# Patient Record
Sex: FEMALE | Race: WHITE
[De-identification: names, ages, dates, MRNs, and addresses within clinical notes are randomized per-mention and may not be internally consistent; named-entity substitution may affect disease eponyms.]

---

## 2017-04-27 NOTE — RADRPT
PROCEDURE:   Right Upper Quadrant Ultrasound. 

 

CLINICAL INDICATION:   Abdominal Pain

 

TECHNIQUE:   Multiple real-time images were acquired of the patient's right upper quadrant abdomen a
nd retroperitoneum utilizing a high resolution transducer. 

 

COMPARISON:   None 

 

FINDINGS:

 

The liver measures 14.1 cm, and demonstrates moderately increased echogenicity. The main portal vein
 is patent with proper directional flow. There is no intrahepatic biliary ductal dilatation. The ext
rahepatic common bile duct measures 3 mm.

 

There is cholelithiasis.  There is mild nonspecific gallbladder wall thickening to 4 mm which may at
 least in part be due to underdistension.  There is no pericholecystic fluid.

 

The visualized pancreas is unremarkable.

 

The right kidney measures 9.7 x 4.1 x 5.4 cm and demonstrates normal echotexture. There is no right 
renal calculus or hydronephrosis.

 

The visualized abdominal aorta and IVC are grossly unremarkable.

 

 

IMPRESSION:

Moderate fatty infiltration of the liver.

 

There is cholelithiasis.  There is mild nonspecific gallbladder wall thickening which may at least i
n part be due to underdistension without pericholecystic fluid.  Acute cholecystitis is considered u
nlikely although clinical correlation is recommended.  Normal CBD.

 

 

RPTAT: EE

_____________________________________________ 

Physician Sisi           Date    Time 

Electronically viewed and signed by Physician Sisi on 04/27/2017 13:41 

 

D:  04/27/2017 13:41  T:  04/27/2017 13:41

ROSEANNE/

## 2017-04-27 NOTE — ERD
ER Documentation


Chief Complaint


Date/Time


DATE: 4/27/17


Chief Complaint


Right upper quadrant abdominal pain





HPI


The patient is a 21-year-old female who presents to the Emergency Department 

with complaint of intermittent right upper quadrant abdominal pain for one 

week. The patient reports that she has been experiencing her "gallstone pain" 

with increasing frequency over the past week.  The pain is localized to the 

right upper quadrant of her abdomen and radiates to the back.  It is aching in 

nature, and intermittent, often provoked by eating large, heavy meals. In the 

past, she would take the previously prescribed medications (Tramadol, Ibuprofen 

and Pepcid) with complete relief of pain. However, she notes that she ran out 

of her medications approximately 10 days ago, and therefore has not had any 

medication for relief of her pain.  She notes 0/10 pain at this time, with 10/

10 pain at maximum intensity. Her pain last occurred last night, and therefore 

she tried to make an appointment with her primary medical provider today, 

though no appointments were available. Therefore, she presents today. She 

denies any nausea, vomiting, diarrhea, dysuria, hematuria, flank pain, urinary 

frequency, urinary urgency, vaginal bleeding or new vaginal discharge. Last 

menstrual period was 3/17/2017.





ROS


All systems reviewed and are negative except as per history of present illness.





Medications


Home Meds


Active Scripts


Docusate Sodium* (Colace*) 100 Mg Capsule, 100 MG PO TID, #30 CAP


   Prov:CYNTHIA CHIN PA-C         4/27/17


Famotidine* (Pepcid*) 20 Mg Tablet, 20 MG PO BID, #20 TAB


   Prov:CYNTHIA CHIN PA-C         4/27/17


Ibuprofen* (Motrin*) 600 Mg Tab, 600 MG PO Q6, #30 TAB


   Prov:CYNTHIA CHIN PA-C         4/27/17


Tramadol HCl (Tramadol HCl) 50 Mg Tablet, 50 MG PO Q6 Y for PAIN, #15 TAB


   Prov:CYNTHIA CHIN PA-C         4/27/17





Allergies


Allergies:  


Coded Allergies:  


     No Known Allergy (Unverified , 4/27/17)





PMhx/Soc


Medical and Surgical Hx:  pt denies Medical Hx, pt denies Surgical Hx


Hx Alcohol Use:  No


Hx Substance Use:  No


Hx Tobacco Use:  No





Physical Exam


Vitals





Vital Signs








  Date Time  Temp Pulse Resp B/P Pulse Ox O2 Delivery O2 Flow Rate FiO2


 


4/27/17 11:11 98.0 60 18 124/60 97   








Physical Exam


GENERAL: Well-developed, well-nourished, female, in no acute distress


HEENT: Head is normocephalic, atraumatic. No scleral pallor or icterus. Pupils 

equal, round and reactive to light. Extraocular movements intact. Conjunctiva 

pink. Moist mucous membranes. 


NECK: Supple. No masses, no tenderness, no lymphadenopathy. Trachea midline. 


RESPIRATORY: Lungs are clear to auscultation bilaterally. Equal breath sounds.  

Normal expiratory effort. 


CARDIOVASCULAR:  Regular rate and rhythm. S1 and S2 normal.   No murmurs, rubs, 

or gallops.  Distal pulses are palpable, 2+ bilaterally. Capillary refill is 

less than 2 seconds. 


GASTROINTESTINAL: Abdomen is soft, nontender and nondistended. Negative Gonzalez'

s sign. No tenderness at McBurney's point. No guarding, no rebound tenderness. 

Normal bowel sounds. No gross peritonitis. 


FLANK: No CVA tenderness.


EXTREMITIES: No clubbing, cyanosis, or edema. Normal skin perfusion. Moving all 

extremities. Muscle tone is normal.  No focal swelling or erythema. 


NEUROLOGIC: The patient is alert, awake, and oriented x 3. No focal neurologic 

deficits.  


INTEGUMENT:  Skin is intact. Warm and dry. 


PSYCHIATRIC: Cooperative; appropriate.


Result Diagram:  


4/27/17 1250                                                                   

             4/27/17 1250





Results 24 hrs





 Laboratory Tests








Test


  4/27/17


12:40 4/27/17


12:50


 


Urine Color LT. YELLOW  


 


Urine Clarity CLEAR  


 


Urine pH 6.0  


 


Urine Specific Gravity 1.025  


 


Urine Ketones NEGATIVE  


 


Urine Nitrite NEGATIVE  


 


Urine Bilirubin NEGATIVE  


 


Urine Urobilinogen 0.2  E.U./dL  


 


Urine Leukocyte Esterase NEGATIVE  


 


Urine Hemoglobin NEGATIVE  


 


Urine Glucose NEGATIVE%  


 


Urine Total Protein NEGATIVE  


 


White Blood Count  7.110^3/ul 


 


Red Blood Count  4.4010^6/ul 


 


Hemoglobin  11.4g/dl 


 


Hematocrit  37.0% 


 


Mean Corpuscular Volume  84.1fl 


 


Mean Corpuscular Hemoglobin  25.9pg 


 


Mean Corpuscular Hemoglobin


Concent 


  30.8g/dl 


 


 


Red Cell Distribution Width  13.8% 


 


Platelet Count  72050^3/UL 


 


Mean Platelet Volume  10.3fl 


 


Neutrophils %  58.2% 


 


Lymphocytes %  32.4% 


 


Monocytes %  5.6% 


 


Eosinophils %  3.4% 


 


Basophils %  0.3% 


 


Nucleated Red Blood Cells %  0.0/100WBC 


 


Neutrophils #  4.210^3/ul 


 


Lymphocytes #  2.310^3/ul 


 


Monocytes #  0.410^3/ul 


 


Eosinophils #  0.210^3/ul 


 


Basophils #  0.010^3/ul 


 


Nucleated Red Blood Cells #  0.010^3/ul 


 


Sodium Level  143mmol/L 


 


Potassium Level  3.7mmol/L 


 


Chloride Level  105mmol/L 


 


Carbon Dioxide Level  26mmol/L 


 


Anion Gap  16 


 


Blood Urea Nitrogen  11mg/dl 


 


Creatinine  0.68mg/dl 


 


Glucose Level  113mg/dl 


 


Calcium Level  9.3mg/dl 


 


Total Bilirubin  0.2mg/dl 


 


Direct Bilirubin  0.00mg/dl 


 


Indirect Bilirubin  0.2mg/dl 


 


Aspartate Amino Transf


(AST/SGOT) 


  28IU/L 


 


 


Alanine Aminotransferase


(ALT/SGPT) 


  34IU/L 


 


 


Alkaline Phosphatase  87IU/L 


 


Total Protein  8.3g/dl 


 


Albumin  4.5g/dl 


 


Globulin  3.80g/dl 


 


Albumin/Globulin Ratio  1.18 


 


Lipase  137U/L 











Procedures/MDM


Diagnostic Tests and Interpretation:


PROCEDURE:   Right Upper Quadrant Ultrasound.  


CLINICAL INDICATION:   Abdominal Pain 


TECHNIQUE:   Multiple real-time images were acquired of the patient's right 

upper quadrant abdomen and retroperitoneum utilizing a high resolution 

transducer.  


COMPARISON:   None  


FINDINGS: 


The liver measures 14.1 cm, and demonstrates moderately increased echogenicity. 

The main portal vein is patent with proper directional flow. There is no 

intrahepatic biliary ductal dilatation. The extrahepatic common bile duct 

measures 3 mm. 


There is cholelithiasis.  There is mild nonspecific gallbladder wall thickening 

to 4 mm which may at least in part be due to underdistension.  There is no 

pericholecystic fluid. 


The visualized pancreas is unremarkable. 


The right kidney measures 9.7 x 4.1 x 5.4 cm and demonstrates normal 

echotexture. There is no right renal calculus or hydronephrosis. 


The visualized abdominal aorta and IVC are grossly unremarkable. 


IMPRESSION: Moderate fatty infiltration of the liver. There is cholelithiasis.  

There is mild nonspecific gallbladder wall thickening which may at least in 

part be due to underdistension without pericholecystic fluid.  Acute 

cholecystitis is considered unlikely although clinical correlation is 

recommended.  Normal CBD.


_____________________________________________ 


Physician Sisi           Date    Time 


Electronically viewed and signed by Gennaro Trivedi Physician on 04/27/2017 13:41 





Imaging findings discussed with Dr. Bethea, who recommends that the patient be 

discharged home, as she is experiencing no current pain at this time, negative 

Gonzalez's sign, no leukocytosis, normal bilirubin, and no transaminitis. 





Medical Decision Making: This is a 21-year-old female presenting to the 

Emergency Department with complaint of right upper quadrant abdominal pain. On 

physical examination, the patient's abdomen was soft throughout, with no 

tenderness to palpation, no distention, no guarding, no gross peritonitis. 

Differential diagnosis includes, but is not limited to, gastroenteritis, 

gastritis, cholecystitis, cholangitis, choledocholithiasis, pancreatitis, 

perforated viscus, mesenteric ischemia, GERD, PUD, urinary tract infection, 

acute coronary syndrome, peptic ulcer disease, pyelonephritis, pneumonia, 

hepatitis, infectious diarrhea, IBD, aortic dissection, torsion, bowel 

obstruction, appendicitis, diverticulitis. Laboratory analysis with no 

leukocytosis. No elevated bilirubin or transaminitis. Lipase is normal. 

Urinalysis with no nitrites, no urine leukocyte esterace, no evidence for 

urinary tract infection. Ultrasound of the right upper quadrant revealed 

cholelithiasis with nonspecific gallbladder wall thickening. However, no 

pericholecystic fluid noted, no additional findings to suggest acute 

cholecystitis. Otherwise, no evidence of acute cholangitis, choledocholithiasis 

or gallstone pancreatitis. After rest, the patient reports no new complaints 

and no pain.





Upon review and interpretation of the patient's presentation and overall ER 

course, I believe the patient's symptoms are most consistent with right upper 

quadrant abdominal pain, likely secondary to biliary colic/cholelithiasis 

without cholecystitis. Doubt acute coronary syndrome - symptoms and examination 

inconsistent. Doubt pancreatitis - clinical presentation inconsistent, lipase 

normal. Doubt perforated ulcer, patient has a non-surgical abdomen. Doubt small 

bowel obstruction, patient is passing flatus, abdomen is non-distended. Doubt 

appendicitis, patient has no McBurney's point tenderness, no guarding, non-

surgical abdomen, no tenderness over the RLQ. Doubt diverticulitis, exam 

inconsistent. Doubt ischemic bowel, no pain out of proportion to examination. 

Doubt torsion, symptoms and examination inconsistent.





At this time, the patient is in stable condition and therefore can be 

discharged home with prescriptions for Ibuprofen, Pepcid (per patient's request)

, Tramadol and Colace, and strict return precautions for signs of deteriorating 

or worsening condition. She is advised to follow up with her primary care 

provider in 2-3 days for reevaluation and further management, or return to the 

ER sooner if symptoms worsen. I shared my medical decision making, plan, as 

well as the results with the patient at length and in great detail, and she 

verbally understands and agrees with the plan for further observation and care 

as an outpatient. At the time of discharge, all questions were answered.





Departure


Diagnosis:  


 Primary Impression:  


 Biliary colic


 Additional Impressions:  


 Cholelithiasis without cholecystitis


 Right upper quadrant abdominal pain


Condition:  Stable


Patient Instructions:  Biliary Colic With Gallstone (Confirmed), Coping with 

Colic, Gallstones





Additional Instructions:  


Call your primary care doctor TOMORROW for an appointment during the next 2-3 

days.See the doctor sooner or return here if your condition worsens before your 

appointment time.











CYNTHIA CHIN PA-C Apr 27, 2017 13:20

## 2017-11-26 ENCOUNTER — HOSPITAL ENCOUNTER (EMERGENCY)
Dept: HOSPITAL 10 - FTE | Age: 22
Discharge: HOME | End: 2017-11-26
Payer: COMMERCIAL

## 2017-11-26 VITALS
WEIGHT: 166.67 LBS | BODY MASS INDEX: 29.53 KG/M2 | WEIGHT: 166.67 LBS | HEIGHT: 63 IN | BODY MASS INDEX: 29.53 KG/M2 | HEIGHT: 63 IN

## 2017-11-26 DIAGNOSIS — K80.20: Primary | ICD-10-CM

## 2017-11-26 DIAGNOSIS — N39.0: ICD-10-CM

## 2017-11-26 LAB
ADD UMIC: YES
ALBUMIN SERPL-MCNC: 4.3 G/DL (ref 3.3–4.9)
ALBUMIN/GLOB SERPL: 1.19 {RATIO}
ALP SERPL-CCNC: 109 IU/L (ref 42–121)
ALT SERPL-CCNC: 34 IU/L (ref 13–69)
ANION GAP SERPL CALC-SCNC: 16 MMOL/L (ref 8–16)
AST SERPL-CCNC: 20 IU/L (ref 15–46)
BACTERIA #/AREA URNS HPF: (no result) /HPF
BASOPHILS # BLD AUTO: 0 10^3/UL (ref 0–0.1)
BASOPHILS NFR BLD: 0.2 % (ref 0–2)
BILIRUB DIRECT SERPL-MCNC: 0 MG/DL (ref 0–0.2)
BILIRUB SERPL-MCNC: 0.1 MG/DL (ref 0.2–1.3)
BUN SERPL-MCNC: 11 MG/DL (ref 7–20)
CALCIUM SERPL-MCNC: 9.1 MG/DL (ref 8.4–10.2)
CHLORIDE SERPL-SCNC: 104 MMOL/L (ref 97–110)
CO2 SERPL-SCNC: 27 MMOL/L (ref 21–31)
COLOR UR: YELLOW
CREAT SERPL-MCNC: 0.78 MG/DL (ref 0.44–1)
EOSINOPHIL # BLD: 0.1 10^3/UL (ref 0–0.5)
EOSINOPHIL NFR BLD: 0.5 % (ref 0–7)
ERYTHROCYTE [DISTWIDTH] IN BLOOD BY AUTOMATED COUNT: 13.5 % (ref 11.5–14.5)
GLOBULIN SER-MCNC: 3.6 G/DL (ref 1.3–3.2)
GLUCOSE SERPL-MCNC: 139 MG/DL (ref 70–220)
GLUCOSE UR STRIP-MCNC: NEGATIVE MG/DL
HCT VFR BLD CALC: 37.1 % (ref 37–47)
HGB BLD-MCNC: 11.6 G/DL (ref 12–16)
KETONES UR STRIP.AUTO-MCNC: NEGATIVE MG/DL
LYMPHOCYTES # BLD AUTO: 1.2 10^3/UL (ref 0.8–2.9)
LYMPHOCYTES NFR BLD AUTO: 13.3 % (ref 15–51)
MCH RBC QN AUTO: 25.9 PG (ref 29–33)
MCHC RBC AUTO-ENTMCNC: 31.3 G/DL (ref 32–37)
MCV RBC AUTO: 82.8 FL (ref 82–101)
MONOCYTES # BLD: 0.4 10^3/UL (ref 0.3–0.9)
MONOCYTES NFR BLD: 3.8 % (ref 0–11)
MUCOUS THREADS #/AREA URNS HPF: (no result) /HPF
NEUTROPHILS # BLD: 7.6 10^3/UL (ref 1.6–7.5)
NEUTROPHILS NFR BLD AUTO: 82 % (ref 39–77)
NITRITE UR QL STRIP.AUTO: NEGATIVE MG/DL
NRBC # BLD MANUAL: 0 10^3/UL (ref 0–0)
NRBC BLD AUTO-RTO: 0 /100WBC (ref 0–0)
PLATELET # BLD: 288 10^3/UL (ref 140–415)
PMV BLD AUTO: 10.5 FL (ref 7.4–10.4)
POTASSIUM SERPL-SCNC: 3.9 MMOL/L (ref 3.5–5.1)
PROT SERPL-MCNC: 7.9 G/DL (ref 6.1–8.1)
RBC # BLD AUTO: 4.48 10^6/UL (ref 4.2–5.4)
RBC # UR AUTO: (no result) MG/DL
SODIUM SERPL-SCNC: 143 MMOL/L (ref 135–144)
SQUAMOUS #/AREA URNS HPF: (no result) /HPF
UR ASCORBIC ACID: NEGATIVE MG/DL
UR BILIRUBIN (DIP): NEGATIVE MG/DL
UR CLARITY: (no result)
UR PH (DIP): 7 (ref 5–9)
UR RBC: 35 /HPF (ref 0–5)
UR SPECIFIC GRAVITY (DIP): 1.02 (ref 1–1.03)
UR TOTAL PROTEIN (DIP): (no result) MG/DL
UROBILINOGEN UR STRIP-ACNC: NEGATIVE MG/DL
WBC # BLD AUTO: 9.3 10^3/UL (ref 4.8–10.8)
WBC # UR STRIP: (no result) LEU/UL

## 2017-11-26 PROCEDURE — 80053 COMPREHEN METABOLIC PANEL: CPT

## 2017-11-26 PROCEDURE — 74176 CT ABD & PELVIS W/O CONTRAST: CPT

## 2017-11-26 PROCEDURE — 81001 URINALYSIS AUTO W/SCOPE: CPT

## 2017-11-26 PROCEDURE — 36415 COLL VENOUS BLD VENIPUNCTURE: CPT

## 2017-11-26 PROCEDURE — 96374 THER/PROPH/DIAG INJ IV PUSH: CPT

## 2017-11-26 PROCEDURE — 96375 TX/PRO/DX INJ NEW DRUG ADDON: CPT

## 2017-11-26 PROCEDURE — 85025 COMPLETE CBC W/AUTO DIFF WBC: CPT

## 2017-11-26 PROCEDURE — 83690 ASSAY OF LIPASE: CPT

## 2017-11-26 PROCEDURE — 76705 ECHO EXAM OF ABDOMEN: CPT

## 2017-11-26 NOTE — ERD
ER Documentation


Chief Complaint


Chief Complaint


RUQ w/suprapubic pressure just now





HPI


Patient is a 22-year-old female with a past medical history of cholelithiasis 

who presents to the ED for concerns of right-sided flank pain radiating up into 

her right upper quadrant as well as suprapubic tenderness and dysuria.  Patient 

states her symptoms started 2 hours prior to her arrival.  She states she has 

"gallbladder pain".  Patient states that the pain is constant, throbbing in 

nature.  Patient denies any fevers or chills.  Patient minutes to nausea and 

vomiting 3, nonbloody nonbilious.  Also admits to dysuria.  She denies any 

hematuria.  Patien denies any diarrhea, chest pain, shortness of breath, or 

extremity pain or loss consciousness.  Patient denies any previous abdominal 

surgeries.





ROS


All systems reviewed and are negative except as per history of present illness.





Medications


Home Meds


Active Scripts


Ondansetron (Ondansetron Odt) 4 Mg Tab.rapdis, 4 MG PO Q6H Y for NAUSEA AND/OR 

VOMITING, #10 TAB


   Prov:ANGELICA PELLETIER PA-C         17


Hydrocodone/Acetaminophen (Norco 5-325 Tablet) 1 Each Tablet, 1 TAB PO Q6H Y 

for PAIN, #7 TAB


   Prov:ANGELICA PELLETIER PA-C         17


Nitrofurantoin Monohyd Macrocr* (Macrobid*) 100 Mg Capsr, 100 MG PO BID for 5 

Days, CAP


   Prov:ANGELICA PELLETIER PA-C         17


Ibuprofen* (Motrin*) 600 Mg Tab, 600 MG PO Q6, #30 TAB


   Prov:ANGELICA PELLETIER PA-C         17


Docusate Sodium* (Colace*) 100 Mg Capsule, 100 MG PO TID, #30 CAP


   Prov:CYNTHIA CHIN PA-C         17


Famotidine* (Pepcid*) 20 Mg Tablet, 20 MG PO BID, #20 TAB


   Prov:CYNTHIA CHIN PA-C         17


Ibuprofen* (Motrin*) 600 Mg Tab, 600 MG PO Q6, #30 TAB


   Prov:CYNTHIA CHIN PA-C         17


Tramadol HCl (Tramadol HCl) 50 Mg Tablet, 50 MG PO Q6 Y for PAIN, #15 TAB


   Prov:CYNTHIA CHIN PA-C         17





Allergies


Allergies:  


Coded Allergies:  


     No Known Allergy (Unverified , 17)





PMhx/Soc


Medical and Surgical Hx:  pt denies Surgical Hx


Hx Miscellaneous Medical Probl:  Yes (UTI)


Hx Alcohol Use:  No


Hx Substance Use:  No


Hx Tobacco Use:  No


Smoking Status:  Never smoker





Physical Exam


Vitals





Vital Signs








  Date Time  Temp Pulse Resp B/P Pulse Ox O2 Delivery O2 Flow Rate FiO2


 


17 04:04 98.6 60 18 130/64 98   








Physical Exam


GENERAL: Well-developed, well-nourished female. Appears in no acute distress. 


HEAD: Normocephalic, atraumatic. 


EYES: Pupils are equally reactive bilaterally. EOMs grossly intact. No 

conjunctival erythema. 


ENT: Moist mucous membranes. No uvula deviation. No kissing tonsils. 


NECK: Supple. No meningismus. Normal range of motion of the neck.


LUNG: Clear to auscultation bilaterally. No rhonchi, wheezing, rales or coarse 

breath sounds. 


HEART: Regular rate and rhythm. No murmurs, rubs or gallops.


ABDOMEN:  Soft, and nondistended.  Tender to palpation in the right upper 

quadrant.  Positive Gonzalez sign.  Positive bowel sounds in all four quadrants. 

No rebound tenderness, no guarding. (-) McBurney's point tenderness. No CVA 

tenderness.


EXTREMITIES: Equal pulses bilaterally. No peripheral clubbing, cyanosis or 

edema. No unilateral leg swelling.


NEUROLOGIC: Alert and oriented. Moving all four extremities without any 

difficulty. Normal speech. Steady gait. 


SKIN: Normal color. Warm and dry. No rashes or lesions.


Result Diagram:  


170                                                                  

              17 0450





Results 24 hrs





 Laboratory Tests








Test


  17


04:50


 


White Blood Count 9.310^3/ul 


 


Red Blood Count 4.4810^6/ul 


 


Hemoglobin 11.6g/dl 


 


Hematocrit 37.1% 


 


Mean Corpuscular Volume 82.8fl 


 


Mean Corpuscular Hemoglobin 25.9pg 


 


Mean Corpuscular Hemoglobin


Concent 31.3g/dl 


 


 


Red Cell Distribution Width 13.5% 


 


Platelet Count 94797^3/UL 


 


Mean Platelet Volume 10.5fl 


 


Neutrophils % 82.0% 


 


Lymphocytes % 13.3% 


 


Monocytes % 3.8% 


 


Eosinophils % 0.5% 


 


Basophils % 0.2% 


 


Nucleated Red Blood Cells % 0.0/100WBC 


 


Neutrophils # 7.610^3/ul 


 


Lymphocytes # 1.210^3/ul 


 


Monocytes # 0.410^3/ul 


 


Eosinophils # 0.110^3/ul 


 


Basophils # 0.010^3/ul 


 


Nucleated Red Blood Cells # 0.010^3/ul 


 


Urine Color YELLOW 


 


Urine Clarity CLOUDY 


 


Urine pH 7.0 


 


Urine Specific Gravity 1.019 


 


Urine Ketones NEGATIVEmg/dL 


 


Urine Nitrite NEGATIVEmg/dL 


 


Urine Bilirubin NEGATIVEmg/dL 


 


Urine Urobilinogen NEGATIVEmg/dL 


 


Urine Leukocyte Esterase 1+Heidi/ul 


 


Urine Microscopic RBC 35/HPF 


 


Urine Microscopic WBC 97/HPF 


 


Urine Squamous Epithelial


Cells FEW/HPF 


 


 


Urine Bacteria MODERATE/HPF 


 


Urine Mucus MODERATE/HPF 


 


Urine Hemoglobin 1+mg/dL 


 


Urine Glucose NEGATIVEmg/dL 


 


Urine Total Protein 1+mg/dl 


 


Sodium Level 143mmol/L 


 


Potassium Level 3.9mmol/L 


 


Chloride Level 104mmol/L 


 


Carbon Dioxide Level 27mmol/L 


 


Anion Gap 16 


 


Blood Urea Nitrogen 11mg/dl 


 


Creatinine 0.78mg/dl 


 


Glucose Level 139mg/dl 


 


Calcium Level 9.1mg/dl 


 


Total Bilirubin 0.1mg/dl 


 


Direct Bilirubin 0.00mg/dl 


 


Indirect Bilirubin 0.1mg/dl 


 


Aspartate Amino Transf


(AST/SGOT) 20IU/L 


 


 


Alanine Aminotransferase


(ALT/SGPT) 34IU/L 


 


 


Alkaline Phosphatase 109IU/L 


 


Total Protein 7.9g/dl 


 


Albumin 4.3g/dl 


 


Globulin 3.60g/dl 


 


Albumin/Globulin Ratio 1.19 


 


Lipase 151U/L 








 Current Medications








 Medications


  (Trade)  Dose


 Ordered  Sig/Sowmya


 Route


 PRN Reason  Start Time


 Stop Time Status Last Admin


Dose Admin


 


 Ondansetron HCl


  (Zofran Inj)  4 mg  ONCE  STAT


 IV


   17 04:27


 17 04:28 DC 17 04:56


 


 


 Ketorolac


 Tromethamine


  (Toradol)  30 mg  ONCE  STAT


 IV


   17 04:27


 17 04:28 DC 17 04:56


 











Procedures/MDM


ED COURSE:


The patient was stable throughout ED course. I kept the patient and/or family 

informed of laboratory and diagnostic imaging results throughout the ED course.

  





 


DIAGNOSTIC IMAGING:


Read by radiologist.





 Patient: ZOEY BACA   : 1995   Age: 22  Sex: F                  

      


 MR #:    [id number]   Acct #:   [id number]    DOS: 17


 Ordering MD: ANGELICA PELLETIER PA-C   Location:  [facility]   Room/Bed:                 

                           


 








PROCEDURE:    Abdominal ultrasound, limited. 


 


CLINICAL INDICATION:    Abdominal pain. 


 


TECHNIQUE:    Multiple real-time images were acquired of the patient's right 

upper abdomen utilizing a high resolution transducer. 


 


COMPARISON:   2017. 


 


FINDINGS:


The liver demonstrates increased echogenicity and size measuring 16.9 cm.  

There is no focal mass or intrahepatic biliary ductal dilatation.  The portal 

vein is patent.  The gallbladder is not distended.  Multiple echogenic 

gallstones are identified. There is focal tenderness over the gallbladder. 

There is no pericholecystic fluid. There is mild gallbladder wall thickening 

measuring 3.5 mm.  The common bile duct measures 2.5 mm in maximal dimension.  

The pancreas is obscured by overlying bowel gas.  No free fluid is identified.


 


The right kidney is normal size and echogenicity measuring 10.1 cm.  There is 

no focal renal mass or echogenic calculus identified.  There is no obstructive 

uropathy. 


 


IMPRESSION:


Cholelithiasis with mild gallbladder wall thickening and positive sonographic 

Gonzalez's sign.


Fatty infiltration of the liver.


Pancreas obscured by overlying bowel gas.


_____________________________________________ 


.Edenilson Miranda MD, MD           Date    Time 


Electronically viewed and signed by .Edenilson Miranda MD, MD on 2017 05:51 


 


D:  2017 05:51  T:  2017 05:51


.T/





CC: ANGELICA PELLETIER PA-C











 Patient: ZOEY BACA   : 1995   Age: 22  Sex: F                  

      


 MR #:    [id number]   Acct #:   [id number]    DOS: 17


 Ordering MD: ANGELICA PELLETIER PA-C   Location:  [facility]   Room/Bed:                 

                           


 








PROCEDURE:   CT Abdomen and pelvis without contrast. 


 


CLINICAL INDICATION:   Abdominal pain. 


 


TECHNIQUE:    CT scan of the abdomen and pelvis was performed on a multi-

detector high-resolution CT scanner.  Contiguous axial images were obtained 

from the lung bases to the ischial tuberosities without intravenous contrast.  

Coronal and sagittal reformatted images were also obtained.  Images were 

reviewed on the PACS workstation. DICOM images are available.


 


One or more of the following dose reduction techniques were used:


- Automated exposure control.


- Adjustment of the mA and/or kV according to patient size.


- Use of iterative reconstruction technique.


 


Exam CTD/vol = 12.35 mGy.


Total exam DLP = 701.75 mGy-cm.   


 


COMPARISON:   None. 


 


FINDINGS:


Evaluation of the lung bases demonstrates no pleural or parenchymal disease.


 


Abdomen:  The liver is normal in size.  There is no focal mass or dilatation of 

the biliary tree.  The gallbladder is not distended. Multiple radiolucent 

gallstones are present. There is mild gallbladder wall thickening. The spleen, 

pancreas and bilateral adrenal glands are within normal limits.  Bilateral 

kidneys are normal in size with no contour deforming mass identified. There is 

mild right renal cortical scarring.  There is no radiopaque renal or ureteral 

calculus identified.  There is no hydronephrosis or hydroureter.  There is no 

retroperitoneal adenopathy.  The abdominal aorta is of normal caliber.


 


There is no abnormal bowel wall thickening or distension.  There is no bowel 

obstruction or free air.  A normal appendix is identified.  There is no 

diverticulosis or diverticulitis.  There is no ascites.


 


Pelvis:  The bladder demonstrates mild wall thickening.  The uterus and adnexa 

are within normal limits.  There is mild pelvic free fluid.  There is no 

significant pelvic adenopathy.


 


Evaluation of the osseous structures demonstrates no suspicious lytic or 

blastic lesion. 


 


IMPRESSION:


Cholelithiasis with mild gallbladder wall thickening.


Mild bladder wall thickening could suggest cystitis. Clinical correlation is 

needed.


Mild pelvic free fluid.


Mild right renal cortical scarring.


Normal appendix.


_____________________________________________ 


.Edenilson Miranda MD, MD           Date    Time 


Electronically viewed and signed by .Edenilson Miranda MD, MD on 2017 05:50 


 


D:  2017 05:50  T:  2017 05:50


.T/





CC: ANGELICA PELLETIER PA-C








PROCEDURES:


None.





MEDICATIONS GIVEN: 


Toradol, Zofran


Patient tolerated medication well with no adverse reactions. 








MEDICAL DECISION MAKING: 


This is a 22-year-old female who presents to the ED for concerns of right upper 

quadrant pain 2 hours.  Patient denies any fevers or chills.  Patient did 

admit to nausea and vomiting.  Vital signs were reviewed. Patient is afebrile. 


CBC showed no evidence of systemic infection or severe anemia.  CMP showed no 

evidence of electrolyte abnormalities, severe acidosis, alkalosis, renal failure

, or liver disease. Lipase showed no evidence of acute pancreatitis.  UA did 

show 1+ leukocyte esterase, positive WBCs and positive RBCs.  Urine pregnancy 

test was negative. CT abdomen and pelvis showed Cholelithiasis with mild 

gallbladder wall thickening. Mild bladder wall thickening could suggest 

cystitis. Clinical correlation is needed. Mild pelvic free fluid. Mild right 

renal cortical scarring. Normal appendix.  Gallbladder ultrasound showed 

Cholelithiasis with mild gallbladder wall thickening and positive sonographic 

Gonzalez's sign. Fatty infiltration of the liver. Pancreas obscured by overlying 

bowel gas.  I discussed this case with my supervising physician Dr. Sher, 

who also examined the patient reviewed all her laboratory studies and imaging 

studies.  At this time, he does not feel that patient requires admission for 

emergent cholecystectomy or antibiotics.  Patient was advised that she will 

need to have her gallbladder taken out on an outpatient basis.  Referral 

information was provided.  At this time, patient's presentation is most 

consistent with cholelithiasis with gallbladder wall thickening and UTI vs 

early pyelonephritis.  Low suspicion for appendicitis, bowel obstruction, bowel 

perforation, DKA, bowel perforation, acute cholecystitis, choledocholithiasis, 

pancreatitis, diverticulitis, pyelonephritis, nephrolithiasis. 





PRESCRIPTIONS: 


Ibuprofen, Norco, Macrobid, Zofran





DISCHARGE:


At this time, patient is stable for discharge and outpatient management.  

Patient was given a copy of all imaging studies and blood work obtained today.  

Patient advised to follow-up with the surgeon on an outpatient basis.  Referral 

information provided.  I have instructed the patient to follow-up with his/her 

primary care physician in 1-2 days. I have instructed the patient to promptly 

return to the ER at any time for any new or worsening symptoms including 

increased pain, nausea, vomiting, diarrhea, fever, weakness or LOC. The patient 

and/or family expressed understanding of and agreement with this plan. All 

questions were answered. Home care instructions were provided. 








Disclaimer: Inadvertent spelling and grammatical errors are likely due to EHR/

dictation software use and do not reflect on the overall quality of patient 

care. Also, please note that the electronic time recorded on this note does not 

necessarily reflect the actual time of the patient encounter.





Departure


Diagnosis:  


 Primary Impression:  


 Thickening of wall of gallbladder


 Additional Impressions:  


 Cholelithiasis


 Cholelithiasis location:  gallbladder  Cholecystitis presence:  without 

cholecystitis  Biliary obstruction:  without biliary obstruction  Qualified Code

:  K80.20 - Calculus of gallbladder without cholecystitis without obstruction


 UTI (urinary tract infection)


 Urinary tract infection type:  site unspecified  Hematuria presence:  with 

hematuria  Qualified Code:  N39.0 - Urinary tract infection with hematuria, 

site unspecified


Condition:  Stable


Patient Instructions:  Understanding Urinary Tract Infections (UTIs), Gallstones


Referrals:  


JAIME VIERA KEITH MD DEL JUNCO, TIRSO MD FARID,WALTER MIR MD, M.D., SAID MD KOSARI, KAMBIZ M.D.








[facility]


YOU HAVE RECEIVED A MEDICAL SCREENING EXAM AND THE RESULTS INDICATE THAT YOU DO 

NOT HAVE A CONDITION THAT REQUIRES URGENT TREATMENT IN THE EMERGENCY DEPARTMENT.





FURTHER EVALUATION AND TREATMENT OF YOUR CONDITION CAN WAIT UNTIL YOU ARE SEEN 

IN YOUR DOCTORS OFFICE WITHIN THE NEXT 1-2 DAYS. IT IS YOUR RESPONSIBILITY TO 

MAKE AN APPOINTMENT FOR FOLOW-UP CARE.





IF YOU HAVE A PRIMARY DOCTOR


--you should call your primary doctor and schedule an appointment





IF YOU DO NOT HAVE A PRIMARY DOCTOR YOU CAN CALL OUR PHYSICIAN REFERRAL HOTLINE 

AT


 [phone] 





IF YOU CAN NOT AFFORD TO SEE A PHYSICIAN YOU CAN CHOSE FROM THE FOLLOWING 

[facility] CLINICS





[facility] [phone] [address] [facility] [phone] [address] [facility] [phone] [address] [facility] [phone] [address] [facility] [phone] [address] [facility]. [facility]. [phone] [address] [facility]. [facility]


YOU HAVE RECEIVED A MEDICAL SCREENING EXAM AND THE RESULTS INDICATE THAT YOU DO 

NOT HAVE A CONDITION THAT REQUIRES URGENT TREATMENT IN THE EMERGENCY DEPARTMENT.





FURTHER EVALUATION AND TREATMENT OF YOUR CONDITION CAN WAIT UNTIL YOU ARE SEEN 

IN YOUR DOCTORS OFFICE WITHIN THE NEXT 1-2 DAYS. IT IS YOUR RESPONSIBILITY TO 

MAKE AN APPOINTMENT FOR FOLOW-UP CARE.





IF YOU HAVE A PRIMARY DOCTOR


--you should call your primary doctor and schedule and appointment





IF YOU DO NOT HAVE A PRIMARY DOCTOR YOU CAN CALL OUR PHYSICIAN REFERRAL HOTLINE 

AT [phone].





IF YOU CAN NOT AFFORD TO SEE A PHYSICIAN YOU CAN CHOSE FROM THE FOLLOWING 

[facility] INSTITUTIONS:





[facility]


[address] [place] [address]





[facility]


[address] [place] [address]





[facility]


[address] [place] [address]





Additional Instructions:  


Call your primary care doctor TOMORROW for an appointment during the next 1-2 

days.See the doctor sooner or return here if your condition worsens before your 

appointment time.





Follow-up with the general surgeon for further management of your 

cholelithiasis.  You may need to have your gallbladder taken out on an 

outpatient basis.











ANGELICA PELLETIER PA-C 2017 06:27

## 2017-11-26 NOTE — RADRPT
PROCEDURE:   CT Abdomen and pelvis without contrast. 

 

CLINICAL INDICATION:   Abdominal pain. 

 

TECHNIQUE:    CT scan of the abdomen and pelvis was performed on a multi-detector high-resolution CT
 scanner.  Contiguous axial images were obtained from the lung bases to the ischial tuberosities wit
hout intravenous contrast.  Coronal and sagittal reformatted images were also obtained.  Images were
 reviewed on the PACS workstation. DICOM images are available.

 

One or more of the following dose reduction techniques were used:

- Automated exposure control.

- Adjustment of the mA and/or kV according to patient size.

- Use of iterative reconstruction technique.

 

Exam CTD/vol = 12.35 mGy.

Total exam DLP = 701.75 mGy-cm.   

 

COMPARISON:   None. 

 

FINDINGS:

Evaluation of the lung bases demonstrates no pleural or parenchymal disease.

 

Abdomen:  The liver is normal in size.  There is no focal mass or dilatation of the biliary tree.  T
he gallbladder is not distended. Multiple radiolucent gallstones are present. There is mild gallblad
kareem wall thickening. The spleen, pancreas and bilateral adrenal glands are within normal limits.  Bi
lateral kidneys are normal in size with no contour deforming mass identified. There is mild right re
nal cortical scarring.  There is no radiopaque renal or ureteral calculus identified.  There is no h
ydronephrosis or hydroureter.  There is no retroperitoneal adenopathy.  The abdominal aorta is of no
rmal caliber.

 

There is no abnormal bowel wall thickening or distension.  There is no bowel obstruction or free air
.  A normal appendix is identified.  There is no diverticulosis or diverticulitis.  There is no asci
samir.

 

Pelvis:  The bladder demonstrates mild wall thickening.  The uterus and adnexa are within normal bethea
its.  There is mild pelvic free fluid.  There is no significant pelvic adenopathy.

 

Evaluation of the osseous structures demonstrates no suspicious lytic or blastic lesion. 

 

IMPRESSION:

Cholelithiasis with mild gallbladder wall thickening.

Mild bladder wall thickening could suggest cystitis. Clinical correlation is needed.

Mild pelvic free fluid.

Mild right renal cortical scarring.

Normal appendix.

_____________________________________________ 

.Edenilson Miranda MD, MD           Date    Time 

Electronically viewed and signed by .Edenilson Miranda MD, MD on 11/26/2017 05:50 

 

D:  11/26/2017 05:50  T:  11/26/2017 05:50

.T/

## 2017-11-26 NOTE — RADRPT
PROCEDURE:    Abdominal ultrasound, limited. 

 

CLINICAL INDICATION:    Abdominal pain. 

 

TECHNIQUE:    Multiple real-time images were acquired of the patient's right upper abdomen utilizing
 a high resolution transducer. 

 

COMPARISON:   04/27/2017. 

 

FINDINGS:

The liver demonstrates increased echogenicity and size measuring 16.9 cm.  There is no focal mass or
 intrahepatic biliary ductal dilatation.  The portal vein is patent.  The gallbladder is not distend
ed.  Multiple echogenic gallstones are identified. There is focal tenderness over the gallbladder. T
here is no pericholecystic fluid. There is mild gallbladder wall thickening measuring 3.5 mm.  The c
ommon bile duct measures 2.5 mm in maximal dimension.  The pancreas is obscured by overlying bowel g
as.  No free fluid is identified.

 

The right kidney is normal size and echogenicity measuring 10.1 cm.  There is no focal renal mass or
 echogenic calculus identified.  There is no obstructive uropathy. 

 

IMPRESSION:

Cholelithiasis with mild gallbladder wall thickening and positive sonographic Gonzalez's sign.

Fatty infiltration of the liver.

Pancreas obscured by overlying bowel gas.

_____________________________________________ 

.Edenilson Miranda MD, MD           Date    Time 

Electronically viewed and signed by .Edenilson Miranda MD, MD on 11/26/2017 05:51 

 

D:  11/26/2017 05:51  T:  11/26/2017 05:51

.T/

## 2018-03-03 ENCOUNTER — HOSPITAL ENCOUNTER (EMERGENCY)
Age: 23
Discharge: HOME | End: 2018-03-03

## 2018-03-03 ENCOUNTER — HOSPITAL ENCOUNTER (EMERGENCY)
Dept: HOSPITAL 91 - E/R | Age: 23
Discharge: HOME | End: 2018-03-03
Payer: COMMERCIAL

## 2018-03-03 DIAGNOSIS — K82.8: ICD-10-CM

## 2018-03-03 DIAGNOSIS — R10.11: ICD-10-CM

## 2018-03-03 DIAGNOSIS — K80.50: ICD-10-CM

## 2018-03-03 DIAGNOSIS — R10.2: ICD-10-CM

## 2018-03-03 DIAGNOSIS — Z3A.08: ICD-10-CM

## 2018-03-03 DIAGNOSIS — O46.8X1: ICD-10-CM

## 2018-03-03 DIAGNOSIS — O99.611: Primary | ICD-10-CM

## 2018-03-03 LAB
ADD MAN DIFF?: NO
ADD UMIC: YES
ALANINE AMINOTRANSFERASE: 27 IU/L (ref 13–69)
ALBUMIN/GLOBULIN RATIO: 1.13
ALBUMIN: 4.3 G/DL (ref 3.3–4.9)
ALKALINE PHOSPHATASE: 101 IU/L (ref 42–121)
ANION GAP: 19 (ref 8–16)
ASPARTATE AMINO TRANSFERASE: 18 IU/L (ref 15–46)
BASOPHIL #: 0 10^3/UL (ref 0–0.1)
BASOPHILS %: 0.2 % (ref 0–2)
BILIRUBIN,DIRECT: 0 MG/DL (ref 0–0.2)
BILIRUBIN,TOTAL: 0.1 MG/DL (ref 0.2–1.3)
BLOOD UREA NITROGEN: 8 MG/DL (ref 7–20)
CALCIUM: 9.5 MG/DL (ref 8.4–10.2)
CARBON DIOXIDE: 25 MMOL/L (ref 21–31)
CHLORIDE: 103 MMOL/L (ref 97–110)
CREATININE: 0.63 MG/DL (ref 0.44–1)
EOSINOPHILS #: 0 10^3/UL (ref 0–0.5)
EOSINOPHILS %: 0.5 % (ref 0–7)
GLOBULIN: 3.8 G/DL (ref 1.3–3.2)
GLUCOSE: 134 MG/DL (ref 70–220)
HEMATOCRIT: 36.4 % (ref 37–47)
HEMOGLOBIN: 12 G/DL (ref 12–16)
LIPASE: 199 U/L (ref 23–300)
LYMPHOCYTES #: 1.1 10^3/UL (ref 0.8–2.9)
LYMPHOCYTES %: 13.2 % (ref 15–51)
MEAN CORPUSCULAR HEMOGLOBIN: 27.5 PG (ref 29–33)
MEAN CORPUSCULAR HGB CONC: 33 G/DL (ref 32–37)
MEAN CORPUSCULAR VOLUME: 83.3 FL (ref 82–101)
MEAN PLATELET VOLUME: 10.8 FL (ref 7.4–10.4)
MONOCYTE #: 0.3 10^3/UL (ref 0.3–0.9)
MONOCYTES %: 3.6 % (ref 0–11)
NEUTROPHIL #: 7 10^3/UL (ref 1.6–7.5)
NEUTROPHILS %: 82.1 % (ref 39–77)
NUCLEATED RED BLOOD CELLS #: 0 10^3/UL (ref 0–0)
NUCLEATED RED BLOOD CELLS%: 0 /100WBC (ref 0–0)
PLATELET COUNT: 292 10^3/UL (ref 140–415)
POTASSIUM: 3.8 MMOL/L (ref 3.5–5.1)
RED BLOOD COUNT: 4.37 10^6/UL (ref 4.2–5.4)
RED CELL DISTRIBUTION WIDTH: 13.6 % (ref 11.5–14.5)
SODIUM: 143 MMOL/L (ref 135–144)
TOTAL PROTEIN: 8.1 G/DL (ref 6.1–8.1)
UR AMORPHOUS CRYSTAL: (no result) /HPF
UR ASCORBIC ACID: NEGATIVE MG/DL
UR BILIRUBIN (DIP): NEGATIVE MG/DL
UR BLOOD (DIP): (no result) MG/DL
UR CLARITY: (no result)
UR COLOR: YELLOW
UR GLUCOSE (DIP): NEGATIVE MG/DL
UR KETONES (DIP): NEGATIVE MG/DL
UR LEUKOCYTE ESTERASE (DIP): (no result) LEU/UL
UR NITRITE (DIP): NEGATIVE MG/DL
UR PH (DIP): 7 (ref 5–9)
UR RBC: 3 /HPF (ref 0–5)
UR SPECIFIC GRAVITY (DIP): 1.02 (ref 1–1.03)
UR SQUAMOUS EPITHELIAL CELL: (no result) /HPF
UR TOTAL PROTEIN (DIP): NEGATIVE MG/DL
UR UROBILINOGEN (DIP): NEGATIVE MG/DL
UR WBC: 22 /HPF (ref 0–5)
URINE LEUKOCYTE EST (DIP) POC: (no result)
URINE PH (DIP) POC: 7 (ref 5–8.5)
URINE TOTAL PROTEIN POC: (no result)
WHITE BLOOD COUNT: 8.5 10^3/UL (ref 4.8–10.8)

## 2018-03-03 PROCEDURE — 96375 TX/PRO/DX INJ NEW DRUG ADDON: CPT

## 2018-03-03 PROCEDURE — 76705 ECHO EXAM OF ABDOMEN: CPT

## 2018-03-03 PROCEDURE — 81001 URINALYSIS AUTO W/SCOPE: CPT

## 2018-03-03 PROCEDURE — 96374 THER/PROPH/DIAG INJ IV PUSH: CPT

## 2018-03-03 PROCEDURE — 83690 ASSAY OF LIPASE: CPT

## 2018-03-03 PROCEDURE — 99285 EMERGENCY DEPT VISIT HI MDM: CPT

## 2018-03-03 PROCEDURE — 85025 COMPLETE CBC W/AUTO DIFF WBC: CPT

## 2018-03-03 PROCEDURE — 80053 COMPREHEN METABOLIC PANEL: CPT

## 2018-03-03 PROCEDURE — 81003 URINALYSIS AUTO W/O SCOPE: CPT

## 2018-03-03 PROCEDURE — 36415 COLL VENOUS BLD VENIPUNCTURE: CPT

## 2018-03-03 PROCEDURE — 76801 OB US < 14 WKS SINGLE FETUS: CPT

## 2018-03-03 RX ADMIN — ONDANSETRON HYDROCHLORIDE 1 MG: 2 INJECTION, SOLUTION INTRAMUSCULAR; INTRAVENOUS at 03:51

## 2018-03-03 RX ADMIN — MORPHINE SULFATE 1 MG: 2 INJECTION, SOLUTION INTRAMUSCULAR; INTRAVENOUS at 03:51

## 2018-03-03 RX ADMIN — THIAMINE HYDROCHLORIDE 1 MLS/HR: 100 INJECTION, SOLUTION INTRAMUSCULAR; INTRAVENOUS at 03:51

## 2018-03-03 RX ADMIN — ACETAMINOPHEN 1 MG: 325 TABLET, FILM COATED ORAL at 04:25

## 2018-08-12 ENCOUNTER — HOSPITAL ENCOUNTER (INPATIENT)
Dept: HOSPITAL 91 - OBT | Age: 23
LOS: 1 days | Discharge: HOME | DRG: 780 | End: 2018-08-13
Payer: COMMERCIAL

## 2018-08-12 ENCOUNTER — HOSPITAL ENCOUNTER (INPATIENT)
Age: 23
LOS: 1 days | Discharge: HOME | DRG: 780 | End: 2018-08-13

## 2018-08-12 DIAGNOSIS — Z3A.33: ICD-10-CM

## 2018-08-12 DIAGNOSIS — O47.03: Primary | ICD-10-CM

## 2018-08-12 LAB
ADD MAN DIFF?: NO
ADD UMIC: NO
BASOPHIL #: 0 10^3/UL (ref 0–0.1)
BASOPHILS %: 0.1 % (ref 0–2)
EOSINOPHILS #: 0 10^3/UL (ref 0–0.5)
EOSINOPHILS %: 0 % (ref 0–7)
HEMATOCRIT: 26.2 % (ref 37–47)
HEMOGLOBIN: 8.3 G/DL (ref 12–16)
IMMATURE GRANS #M: 0.03 10^3/UL
IMMATURE GRANS % (M): 0.3 %
LYMPHOCYTES #: 1.1 10^3/UL (ref 0.8–2.9)
LYMPHOCYTES %: 12.4 % (ref 15–51)
MEAN CORPUSCULAR HEMOGLOBIN: 27 PG (ref 29–33)
MEAN CORPUSCULAR HGB CONC: 31.7 G/DL (ref 32–37)
MEAN CORPUSCULAR VOLUME: 85.3 FL (ref 82–101)
MEAN PLATELET VOLUME: 11.3 FL (ref 7.4–10.4)
MONOCYTE #: 0.5 10^3/UL (ref 0.3–0.9)
MONOCYTES %: 5.3 % (ref 0–11)
NEUTROPHIL #: 7.1 10^3/UL (ref 1.6–7.5)
NEUTROPHILS %: 81.9 % (ref 39–77)
NUCLEATED RED BLOOD CELLS #: 0 10^3/UL (ref 0–0)
NUCLEATED RED BLOOD CELLS%: 0 /100WBC (ref 0–0)
PLATELET COUNT: 219 10^3/UL (ref 140–415)
RED BLOOD COUNT: 3.07 10^6/UL (ref 4.2–5.4)
RED CELL DISTRIBUTION WIDTH: 13.2 % (ref 11.5–14.5)
RUPTURE FETAL MEMBRANES: NEGATIVE
UR ASCORBIC ACID: NEGATIVE MG/DL
UR BILIRUBIN (DIP): NEGATIVE MG/DL
UR BLOOD (DIP): NEGATIVE MG/DL
UR CLARITY: CLEAR
UR COLOR: YELLOW
UR GLUCOSE (DIP): NEGATIVE MG/DL
UR KETONES (DIP): NEGATIVE MG/DL
UR LEUKOCYTE ESTERASE (DIP): NEGATIVE LEU/UL
UR NITRITE (DIP): NEGATIVE MG/DL
UR PH (DIP): 7 (ref 5–9)
UR SPECIFIC GRAVITY (DIP): 1.01 (ref 1–1.03)
UR TOTAL PROTEIN (DIP): NEGATIVE MG/DL
UR UROBILINOGEN (DIP): (no result) MG/DL
WHITE BLOOD COUNT: 8.7 10^3/UL (ref 4.8–10.8)

## 2018-08-12 PROCEDURE — 81003 URINALYSIS AUTO W/O SCOPE: CPT

## 2018-08-12 PROCEDURE — 86901 BLOOD TYPING SEROLOGIC RH(D): CPT

## 2018-08-12 PROCEDURE — 84112 EVAL AMNIOTIC FLUID PROTEIN: CPT

## 2018-08-12 PROCEDURE — 86850 RBC ANTIBODY SCREEN: CPT

## 2018-08-12 PROCEDURE — 76817 TRANSVAGINAL US OBSTETRIC: CPT

## 2018-08-12 PROCEDURE — 36415 COLL VENOUS BLD VENIPUNCTURE: CPT

## 2018-08-12 PROCEDURE — 86900 BLOOD TYPING SEROLOGIC ABO: CPT

## 2018-08-12 PROCEDURE — 96372 THER/PROPH/DIAG INJ SC/IM: CPT

## 2018-08-12 PROCEDURE — 85025 COMPLETE CBC W/AUTO DIFF WBC: CPT

## 2018-08-12 PROCEDURE — 96361 HYDRATE IV INFUSION ADD-ON: CPT

## 2018-08-12 PROCEDURE — 76818 FETAL BIOPHYS PROFILE W/NST: CPT

## 2018-08-12 PROCEDURE — 96360 HYDRATION IV INFUSION INIT: CPT

## 2018-08-12 RX ADMIN — PYRIDOXINE HYDROCHLORIDE 1 MLS/HR: 100 INJECTION, SOLUTION INTRAMUSCULAR; INTRAVENOUS at 01:09

## 2018-08-12 RX ADMIN — TERBUTALINE SULFATE 1 MG: 1 INJECTION SUBCUTANEOUS at 01:12

## 2018-08-12 RX ADMIN — Medication 1 TAB: at 08:17

## 2018-08-12 RX ADMIN — TERBUTALINE SULFATE 1 MG: 1 INJECTION SUBCUTANEOUS at 03:04

## 2018-08-12 RX ADMIN — PYRIDOXINE HYDROCHLORIDE 1 MLS/HR: 100 INJECTION, SOLUTION INTRAMUSCULAR; INTRAVENOUS at 02:39

## 2018-08-12 RX ADMIN — PYRIDOXINE HYDROCHLORIDE 1 MLS/HR: 100 INJECTION, SOLUTION INTRAMUSCULAR; INTRAVENOUS at 14:58

## 2018-08-12 RX ADMIN — ACETAMINOPHEN 1 MG: 500 TABLET, FILM COATED ORAL at 05:32

## 2018-08-12 RX ADMIN — DOCUSATE SODIUM 1 MG: 100 CAPSULE, LIQUID FILLED ORAL at 08:16

## 2018-08-12 RX ADMIN — DOCUSATE SODIUM 1 MG: 100 CAPSULE, LIQUID FILLED ORAL at 21:00

## 2018-08-12 RX ADMIN — PYRIDOXINE HYDROCHLORIDE 1 MLS/HR: 100 INJECTION, SOLUTION INTRAMUSCULAR; INTRAVENOUS at 06:08

## 2018-08-12 RX ADMIN — PYRIDOXINE HYDROCHLORIDE 1 MLS/HR: 100 INJECTION, SOLUTION INTRAMUSCULAR; INTRAVENOUS at 23:07

## 2018-08-12 RX ADMIN — FERROUS SULFATE TAB 325 MG (65 MG ELEMENTAL FE) 1 MG: 325 (65 FE) TAB at 08:16

## 2018-08-12 RX ADMIN — BETAMETHASONE SODIUM PHOSPHATE AND BETAMETHASONE ACETATE 1 MG: 3; 3 INJECTION, SUSPENSION INTRA-ARTICULAR; INTRALESIONAL; INTRAMUSCULAR at 06:53

## 2018-08-13 RX ADMIN — BETAMETHASONE SODIUM PHOSPHATE AND BETAMETHASONE ACETATE 1 MG: 3; 3 INJECTION, SUSPENSION INTRA-ARTICULAR; INTRALESIONAL; INTRAMUSCULAR at 06:37

## 2018-08-13 RX ADMIN — Medication 1 TAB: at 08:16

## 2018-08-13 RX ADMIN — DOCUSATE SODIUM 1 MG: 100 CAPSULE, LIQUID FILLED ORAL at 08:16

## 2018-08-13 RX ADMIN — PYRIDOXINE HYDROCHLORIDE 1 MLS/HR: 100 INJECTION, SOLUTION INTRAMUSCULAR; INTRAVENOUS at 06:46

## 2018-08-13 RX ADMIN — FERROUS SULFATE TAB 325 MG (65 MG ELEMENTAL FE) 1 MG: 325 (65 FE) TAB at 08:16

## 2018-09-26 ENCOUNTER — HOSPITAL ENCOUNTER (INPATIENT)
Age: 23
LOS: 4 days | Discharge: HOME | End: 2018-09-30

## 2018-09-26 ENCOUNTER — HOSPITAL ENCOUNTER (INPATIENT)
Dept: HOSPITAL 91 - OBT | Age: 23
LOS: 4 days | Discharge: HOME | End: 2018-09-30
Payer: COMMERCIAL

## 2018-09-26 DIAGNOSIS — Z3A.40: ICD-10-CM

## 2018-09-26 LAB
ADD MAN DIFF?: NO
BASOPHIL #: 0 10^3/UL (ref 0–0.1)
BASOPHILS %: 0.2 % (ref 0–2)
EOSINOPHILS #: 0 10^3/UL (ref 0–0.5)
EOSINOPHILS %: 0.2 % (ref 0–7)
HEMATOCRIT: 32.7 % (ref 37–47)
HEMOGLOBIN: 9.9 G/DL (ref 12–16)
IMMATURE GRANS #M: 0.03 10^3/UL (ref 0–0.03)
IMMATURE GRANS % (M): 0.3 % (ref 0–0.43)
INR: 0.86
LYMPHOCYTES #: 1.9 10^3/UL (ref 0.8–2.9)
LYMPHOCYTES %: 18.9 % (ref 15–51)
MEAN CORPUSCULAR HEMOGLOBIN: 25.3 PG (ref 29–33)
MEAN CORPUSCULAR HGB CONC: 30.3 G/DL (ref 32–37)
MEAN CORPUSCULAR VOLUME: 83.6 FL (ref 82–101)
MEAN PLATELET VOLUME: 12 FL (ref 7.4–10.4)
MONOCYTE #: 0.4 10^3/UL (ref 0.3–0.9)
MONOCYTES %: 4.4 % (ref 0–11)
NEUTROPHIL #: 7.5 10^3/UL (ref 1.6–7.5)
NEUTROPHILS %: 76 % (ref 39–77)
NUCLEATED RED BLOOD CELLS #: 0 10^3/UL (ref 0–0)
NUCLEATED RED BLOOD CELLS%: 0 /100WBC (ref 0–0)
PARTIAL THROMBOPLASTIN TIME: 26.1 SEC (ref 23–35)
PLATELET COUNT: 242 10^3/UL (ref 140–415)
PROTIME: 11.8 SEC (ref 11.9–14.9)
PT RATIO: 0.9
RED BLOOD COUNT: 3.91 10^6/UL (ref 4.2–5.4)
RED CELL DISTRIBUTION WIDTH: 14.3 % (ref 11.5–14.5)
WHITE BLOOD COUNT: 9.8 10^3/UL (ref 4.8–10.8)

## 2018-09-26 PROCEDURE — 76818 FETAL BIOPHYS PROFILE W/NST: CPT

## 2018-09-26 PROCEDURE — 86850 RBC ANTIBODY SCREEN: CPT

## 2018-09-26 PROCEDURE — 86901 BLOOD TYPING SEROLOGIC RH(D): CPT

## 2018-09-26 PROCEDURE — 85730 THROMBOPLASTIN TIME PARTIAL: CPT

## 2018-09-26 PROCEDURE — 85025 COMPLETE CBC W/AUTO DIFF WBC: CPT

## 2018-09-26 PROCEDURE — 87340 HEPATITIS B SURFACE AG IA: CPT

## 2018-09-26 PROCEDURE — 86900 BLOOD TYPING SEROLOGIC ABO: CPT

## 2018-09-26 PROCEDURE — 76815 OB US LIMITED FETUS(S): CPT

## 2018-09-26 PROCEDURE — 62319: CPT

## 2018-09-26 PROCEDURE — 85610 PROTHROMBIN TIME: CPT

## 2018-09-26 PROCEDURE — 86592 SYPHILIS TEST NON-TREP QUAL: CPT

## 2018-09-26 RX ADMIN — BUTORPHANOL TARTRATE 1 MG: 2 INJECTION, SOLUTION INTRAMUSCULAR; INTRAVENOUS at 21:33

## 2018-09-26 RX ADMIN — PYRIDOXINE HYDROCHLORIDE 1 MLS/HR: 100 INJECTION, SOLUTION INTRAMUSCULAR; INTRAVENOUS at 16:27

## 2018-09-27 LAB
HEPATITIS B SURFACE ANTIGEN: NEGATIVE
RAPID PLASMA REAGIN: NONREACTIVE

## 2018-09-27 RX ADMIN — PYRIDOXINE HYDROCHLORIDE 1 MLS/HR: 100 INJECTION, SOLUTION INTRAMUSCULAR; INTRAVENOUS at 08:24

## 2018-09-27 RX ADMIN — PYRIDOXINE HYDROCHLORIDE 1 MLS/HR: 100 INJECTION, SOLUTION INTRAMUSCULAR; INTRAVENOUS at 00:25

## 2018-09-27 RX ADMIN — PYRIDOXINE HYDROCHLORIDE 1 MLS/HR: 100 INJECTION, SOLUTION INTRAMUSCULAR; INTRAVENOUS at 16:23

## 2018-09-28 RX ADMIN — Medication 1 MCG: at 10:05

## 2018-09-28 RX ADMIN — Medication 1 MLS/HR: at 17:25

## 2018-09-28 RX ADMIN — Medication 1 MLS/HR: at 17:07

## 2018-09-28 RX ADMIN — PYRIDOXINE HYDROCHLORIDE 1 MLS/HR: 100 INJECTION, SOLUTION INTRAMUSCULAR; INTRAVENOUS at 00:33

## 2018-09-28 RX ADMIN — Medication 1 MLS/HR: at 21:01

## 2018-09-28 RX ADMIN — ACETAMINOPHEN 1 MG: 325 TABLET, FILM COATED ORAL at 20:59

## 2018-09-28 RX ADMIN — PYRIDOXINE HYDROCHLORIDE 1 MLS/HR: 100 INJECTION, SOLUTION INTRAMUSCULAR; INTRAVENOUS at 15:46

## 2018-09-28 RX ADMIN — DOCUSATE SODIUM AND SENNOSIDES 1 TAB: 8.6; 5 TABLET, FILM COATED ORAL at 20:59

## 2018-09-28 RX ADMIN — Medication 7 APPLIC: at 20:59

## 2018-09-28 RX ADMIN — PYRIDOXINE HYDROCHLORIDE 1 MLS/HR: 100 INJECTION, SOLUTION INTRAMUSCULAR; INTRAVENOUS at 07:25

## 2018-09-28 RX ADMIN — IBUPROFEN 1 MG: 600 TABLET ORAL at 23:38

## 2018-09-29 LAB
ADD MAN DIFF?: NO
BASOPHIL #: 0 10^3/UL (ref 0–0.1)
BASOPHILS %: 0.2 % (ref 0–2)
EOSINOPHILS #: 0 10^3/UL (ref 0–0.5)
EOSINOPHILS %: 0.2 % (ref 0–7)
HEMATOCRIT: 29.9 % (ref 37–47)
HEMOGLOBIN: 9 G/DL (ref 12–16)
IMMATURE GRANS #M: 0.03 10^3/UL (ref 0–0.03)
IMMATURE GRANS % (M): 0.4 % (ref 0–0.43)
LYMPHOCYTES #: 1.4 10^3/UL (ref 0.8–2.9)
LYMPHOCYTES %: 16.7 % (ref 15–51)
MEAN CORPUSCULAR HEMOGLOBIN: 24.8 PG (ref 29–33)
MEAN CORPUSCULAR HGB CONC: 30.1 G/DL (ref 32–37)
MEAN CORPUSCULAR VOLUME: 82.4 FL (ref 82–101)
MEAN PLATELET VOLUME: 12.6 FL (ref 7.4–10.4)
MONOCYTE #: 0.5 10^3/UL (ref 0.3–0.9)
MONOCYTES %: 6 % (ref 0–11)
NEUTROPHIL #: 6.4 10^3/UL (ref 1.6–7.5)
NEUTROPHILS %: 76.5 % (ref 39–77)
NUCLEATED RED BLOOD CELLS #: 0 10^3/UL (ref 0–0)
NUCLEATED RED BLOOD CELLS%: 0 /100WBC (ref 0–0)
PLATELET COUNT: 179 10^3/UL (ref 140–415)
RED BLOOD COUNT: 3.63 10^6/UL (ref 4.2–5.4)
RED CELL DISTRIBUTION WIDTH: 14.3 % (ref 11.5–14.5)
WHITE BLOOD COUNT: 8.3 10^3/UL (ref 4.8–10.8)

## 2018-09-29 RX ADMIN — Medication 1 MCG: at 00:26

## 2018-09-29 RX ADMIN — IBUPROFEN 1 MG: 600 TABLET ORAL at 18:11

## 2018-09-29 RX ADMIN — PYRIDOXINE HYDROCHLORIDE 1 MLS/HR: 100 INJECTION, SOLUTION INTRAMUSCULAR; INTRAVENOUS at 00:00

## 2018-09-29 RX ADMIN — OXYCODONE HYDROCHLORIDE AND ASPIRIN 1 TAB: 4.8355; 325 TABLET ORAL at 03:11

## 2018-09-29 RX ADMIN — ACETAMINOPHEN 1 MG: 325 TABLET, FILM COATED ORAL at 02:01

## 2018-09-29 RX ADMIN — Medication 1 MCG: at 00:25

## 2018-09-29 RX ADMIN — IBUPROFEN 1 MG: 600 TABLET ORAL at 05:29

## 2018-09-29 RX ADMIN — DOCUSATE SODIUM AND SENNOSIDES 1 TAB: 8.6; 5 TABLET, FILM COATED ORAL at 08:56

## 2018-09-29 RX ADMIN — HYDROCODONE BITARTRATE AND ACETAMINOPHEN 1 TAB: 5; 325 TABLET ORAL at 17:07

## 2018-09-29 RX ADMIN — DOCUSATE SODIUM AND SENNOSIDES 1 TAB: 8.6; 5 TABLET, FILM COATED ORAL at 21:09

## 2018-09-29 RX ADMIN — PYRIDOXINE HYDROCHLORIDE 1 MLS/HR: 100 INJECTION, SOLUTION INTRAMUSCULAR; INTRAVENOUS at 06:50

## 2018-09-29 RX ADMIN — IBUPROFEN 1 MG: 600 TABLET ORAL at 12:06

## 2018-09-30 RX ADMIN — IBUPROFEN 1 MG: 600 TABLET ORAL at 07:03

## 2018-09-30 RX ADMIN — MEASLES, MUMPS, AND RUBELLA VIRUS VACCINE LIVE 1 ML: 1000; 12500; 1000 INJECTION, POWDER, LYOPHILIZED, FOR SUSPENSION SUBCUTANEOUS at 09:00

## 2018-09-30 RX ADMIN — DOCUSATE SODIUM AND SENNOSIDES 1 TAB: 8.6; 5 TABLET, FILM COATED ORAL at 09:00

## 2018-09-30 RX ADMIN — IBUPROFEN 1 MG: 600 TABLET ORAL at 12:42

## 2018-09-30 RX ADMIN — HYDROCODONE BITARTRATE AND ACETAMINOPHEN 1 TAB: 5; 325 TABLET ORAL at 10:35

## 2018-09-30 RX ADMIN — IBUPROFEN 1 MG: 600 TABLET ORAL at 00:00
